# Patient Record
Sex: FEMALE | URBAN - METROPOLITAN AREA
[De-identification: names, ages, dates, MRNs, and addresses within clinical notes are randomized per-mention and may not be internally consistent; named-entity substitution may affect disease eponyms.]

---

## 2022-03-16 ENCOUNTER — APPOINTMENT (OUTPATIENT)
Dept: URBAN - METROPOLITAN AREA CLINIC 267 | Age: 72
Setting detail: DERMATOLOGY
End: 2022-03-24

## 2022-03-16 VITALS — HEIGHT: 66 IN | WEIGHT: 169 LBS

## 2022-03-16 DIAGNOSIS — L71.8 OTHER ROSACEA: ICD-10-CM

## 2022-03-16 DIAGNOSIS — L20.89 OTHER ATOPIC DERMATITIS: ICD-10-CM

## 2022-03-16 PROCEDURE — OTHER MIPS QUALITY: OTHER

## 2022-03-16 PROCEDURE — 99214 OFFICE O/P EST MOD 30 MIN: CPT

## 2022-03-16 PROCEDURE — OTHER PRESCRIPTION: OTHER

## 2022-03-16 PROCEDURE — OTHER PRESCRIPTION MEDICATION MANAGEMENT: OTHER

## 2022-03-16 PROCEDURE — OTHER COUNSELING: OTHER

## 2022-03-16 RX ORDER — CLOBETASOL PROPIONATE 0.5 MG/G
CREAM TOPICAL BID
Qty: 45 | Refills: 3 | Status: ERX | COMMUNITY
Start: 2022-03-16

## 2022-03-16 RX ORDER — DOXYCYCLINE HYCLATE 20 MG/1
TABLET, FILM COATED ORAL
Qty: 180 | Refills: 3 | Status: ERX | COMMUNITY
Start: 2022-03-16

## 2022-03-16 ASSESSMENT — LOCATION DETAILED DESCRIPTION DERM
LOCATION DETAILED: LEFT ULNAR DORSAL HAND
LOCATION DETAILED: GLABELLA
LOCATION DETAILED: RIGHT DISTAL PRETIBIAL REGION
LOCATION DETAILED: LEFT DISTAL PRETIBIAL REGION
LOCATION DETAILED: 2ND WEB SPACE RIGHT HAND
LOCATION DETAILED: NASAL DORSUM
LOCATION DETAILED: RIGHT NASAL ALA

## 2022-03-16 ASSESSMENT — LOCATION SIMPLE DESCRIPTION DERM
LOCATION SIMPLE: GLABELLA
LOCATION SIMPLE: RIGHT HAND
LOCATION SIMPLE: LEFT PRETIBIAL REGION
LOCATION SIMPLE: LEFT HAND
LOCATION SIMPLE: NOSE
LOCATION SIMPLE: RIGHT PRETIBIAL REGION
LOCATION SIMPLE: RIGHT NOSE

## 2022-03-16 ASSESSMENT — LOCATION ZONE DERM
LOCATION ZONE: FACE
LOCATION ZONE: NOSE
LOCATION ZONE: LEG
LOCATION ZONE: HAND

## 2022-03-16 NOTE — PROCEDURE: PRESCRIPTION MEDICATION MANAGEMENT
Initiate Treatment: doxycycline hyclate 20 mg tablet Take one tablet twice a day with food\\nQuantity: 180.0 Tablet\\nSi tab po bid with meal for rosacea
Render In Strict Bullet Format?: No
Continue Regimen: clobetasol 0.05 % topical cream Bid\\nQuantity: 45.0 g  Days Supply: 30\\nSig: Apply to affected areas bid
Detail Level: Simple

## 2022-06-15 ENCOUNTER — APPOINTMENT (OUTPATIENT)
Dept: URBAN - METROPOLITAN AREA CLINIC 267 | Age: 72
Setting detail: DERMATOLOGY
End: 2022-06-15

## 2022-06-15 VITALS — HEIGHT: 66 IN | WEIGHT: 168 LBS

## 2022-06-15 DIAGNOSIS — L71.8 OTHER ROSACEA: ICD-10-CM

## 2022-06-15 PROCEDURE — OTHER COUNSELING: OTHER

## 2022-06-15 PROCEDURE — OTHER PRESCRIPTION MEDICATION MANAGEMENT: OTHER

## 2022-06-15 PROCEDURE — 99213 OFFICE O/P EST LOW 20 MIN: CPT

## 2022-06-15 PROCEDURE — OTHER PRESCRIPTION: OTHER

## 2022-06-15 RX ORDER — DOXYCYCLINE HYCLATE 20 MG/1
TABLET, FILM COATED ORAL BID
Qty: 180 | Refills: 3 | Status: ERX

## 2022-06-15 RX ORDER — AZELAIC ACID 0.15 G/G
GEL TOPICAL BID
Qty: 50 | Refills: 5 | Status: ERX | COMMUNITY
Start: 2022-06-15

## 2022-06-15 NOTE — PROCEDURE: PRESCRIPTION MEDICATION MANAGEMENT
Detail Level: Generalized
Continue Regimen: doxycycline hyclate 20 mg tablet BID\\nQuantity: 180.0 Tablet  Days Supply: 90\\nSig: Take one tablet twice a day with food.\\n\\nFinacea 15 % topical gel Bid\\nQuantity: 50.0 g  Days Supply: 30\\nSig: Apply to face twice a day for rosacea
Render In Strict Bullet Format?: No

## 2023-12-28 ENCOUNTER — APPOINTMENT (OUTPATIENT)
Dept: URBAN - METROPOLITAN AREA CLINIC 300 | Age: 73
Setting detail: DERMATOLOGY
End: 2023-12-29

## 2023-12-28 DIAGNOSIS — L27.0 GENERALIZED SKIN ERUPTION DUE TO DRUGS AND MEDICAMENTS TAKEN INTERNALLY: ICD-10-CM

## 2023-12-28 DIAGNOSIS — L71.8 OTHER ROSACEA: ICD-10-CM

## 2023-12-28 PROCEDURE — OTHER MIPS QUALITY: OTHER

## 2023-12-28 PROCEDURE — OTHER PRESCRIPTION MEDICATION MANAGEMENT: OTHER

## 2023-12-28 PROCEDURE — OTHER PRESCRIPTION: OTHER

## 2023-12-28 PROCEDURE — 99213 OFFICE O/P EST LOW 20 MIN: CPT

## 2023-12-28 PROCEDURE — OTHER ADDITIONAL NOTES: OTHER

## 2023-12-28 PROCEDURE — OTHER COUNSELING: OTHER

## 2023-12-28 RX ORDER — AZELAIC ACID 0.15 G/G
GEL TOPICAL BID
Qty: 50 | Refills: 8 | Status: ERX

## 2023-12-28 RX ORDER — CLOBETASOL PROPIONATE 0.5 MG/G
CREAM TOPICAL BID
Qty: 45 | Refills: 3 | Status: ERX

## 2023-12-28 RX ORDER — DOXYCYCLINE HYCLATE 20 MG/1
TABLET, FILM COATED ORAL BID
Qty: 180 | Refills: 3 | Status: ERX

## 2023-12-28 ASSESSMENT — LOCATION DETAILED DESCRIPTION DERM
LOCATION DETAILED: PERIUMBILICAL SKIN
LOCATION DETAILED: RIGHT DORSAL MIDDLE METACARPOPHALANGEAL JOINT
LOCATION DETAILED: GLABELLA
LOCATION DETAILED: RIGHT SUPERIOR MEDIAL UPPER BACK
LOCATION DETAILED: LEFT MEDIAL SUPERIOR CHEST
LOCATION DETAILED: RIGHT SUPERIOR MEDIAL MIDBACK
LOCATION DETAILED: LEFT ULNAR DORSAL HAND

## 2023-12-28 ASSESSMENT — LOCATION SIMPLE DESCRIPTION DERM
LOCATION SIMPLE: LEFT HAND
LOCATION SIMPLE: GLABELLA
LOCATION SIMPLE: CHEST
LOCATION SIMPLE: RIGHT UPPER BACK
LOCATION SIMPLE: ABDOMEN
LOCATION SIMPLE: RIGHT HAND
LOCATION SIMPLE: RIGHT LOWER BACK

## 2023-12-28 ASSESSMENT — LOCATION ZONE DERM
LOCATION ZONE: TRUNK
LOCATION ZONE: HAND
LOCATION ZONE: FACE

## 2023-12-28 NOTE — PROCEDURE: PRESCRIPTION MEDICATION MANAGEMENT
Detail Level: Generalized
Initiate Treatment: Aquaglycolic acid cleanser
Continue Regimen: doxycycline hyclate 20 mg tablet BID\\nQuantity: 180.0 Tablet  Days Supply: 90\\nSig: Take one tablet twice a day with food.\\n\\nFinacea 15 % topical gel Bid\\nQuantity: 50.0 g  Days Supply: 30\\nSig: Apply to face twice a day for rosacea
Render In Strict Bullet Format?: No
Continue Regimen: clobetasol 0.05 % topical cream Bid\\nQuantity: 45.0 g  Days Supply: 30\\nSig: Apply to affected areas bid
Detail Level: Simple

## 2023-12-28 NOTE — HPI: RASH (ROSACEA)
How Severe Is Your Rosacea?: mild
Is This A New Presentation, Or A Follow-Up?: Follow Up Rosacea
Additional History: Pt using doxycycline hyclate 20 mg tablet Take one tablet twice a day with food and Finacea 15 % topical gel Apply to face twice a day for rosacea.\\nPt need refills on doxycycline and finacea.\\n

## 2023-12-28 NOTE — HPI: RASH (ATOPIC DERMATITIS)
How Severe Is Your Atopic Dermatitis?: moderate
Is This A New Presentation, Or A Follow-Up?: Follow Up Atopic Dermatitis
Additional History: Pt stated having breakouts since starting medication ibandronate.\\nPt has had two pills. States she gets better after a while of not taking pills, once next dose of due and taken she flares up.\\nNeed refills on clobetasol.

## 2023-12-28 NOTE — PROCEDURE: ADDITIONAL NOTES
Render Risk Assessment In Note?: no
Detail Level: Simple
Additional Notes: Ass. With ibadronate medication. \\nFlare up begins after taking dose, clears up then breaks out again after taking another dose.\\nAdvised to discuss with Dr. Carol Colmenares and d/c medication.

## 2024-09-12 ENCOUNTER — APPOINTMENT (OUTPATIENT)
Dept: URBAN - METROPOLITAN AREA CLINIC 300 | Age: 74
Setting detail: DERMATOLOGY
End: 2024-09-12

## 2024-09-12 DIAGNOSIS — L57.0 ACTINIC KERATOSIS: ICD-10-CM

## 2024-09-12 DIAGNOSIS — L81.4 OTHER MELANIN HYPERPIGMENTATION: ICD-10-CM

## 2024-09-12 DIAGNOSIS — L82.1 OTHER SEBORRHEIC KERATOSIS: ICD-10-CM

## 2024-09-12 DIAGNOSIS — D22 MELANOCYTIC NEVI: ICD-10-CM

## 2024-09-12 DIAGNOSIS — D18.0 HEMANGIOMA: ICD-10-CM

## 2024-09-12 PROBLEM — D22.39 MELANOCYTIC NEVI OF OTHER PARTS OF FACE: Status: ACTIVE | Noted: 2024-09-12

## 2024-09-12 PROBLEM — D22.61 MELANOCYTIC NEVI OF RIGHT UPPER LIMB, INCLUDING SHOULDER: Status: ACTIVE | Noted: 2024-09-12

## 2024-09-12 PROBLEM — D22.62 MELANOCYTIC NEVI OF LEFT UPPER LIMB, INCLUDING SHOULDER: Status: ACTIVE | Noted: 2024-09-12

## 2024-09-12 PROBLEM — D18.01 HEMANGIOMA OF SKIN AND SUBCUTANEOUS TISSUE: Status: ACTIVE | Noted: 2024-09-12

## 2024-09-12 PROBLEM — D22.5 MELANOCYTIC NEVI OF TRUNK: Status: ACTIVE | Noted: 2024-09-12

## 2024-09-12 PROCEDURE — 99213 OFFICE O/P EST LOW 20 MIN: CPT | Mod: 25

## 2024-09-12 PROCEDURE — OTHER MIPS QUALITY: OTHER

## 2024-09-12 PROCEDURE — OTHER LIQUID NITROGEN: OTHER

## 2024-09-12 PROCEDURE — OTHER REASSURANCE: OTHER

## 2024-09-12 PROCEDURE — OTHER COUNSELING: OTHER

## 2024-09-12 PROCEDURE — 17000 DESTRUCT PREMALG LESION: CPT

## 2024-09-12 ASSESSMENT — LOCATION DETAILED DESCRIPTION DERM
LOCATION DETAILED: RIGHT DISTAL RADIAL DORSAL FOREARM
LOCATION DETAILED: LEFT INFERIOR FOREHEAD
LOCATION DETAILED: RIGHT SUPERIOR LATERAL MIDBACK
LOCATION DETAILED: LEFT RADIAL DORSAL HAND
LOCATION DETAILED: RIGHT INFERIOR CENTRAL MALAR CHEEK
LOCATION DETAILED: RIGHT SUPERIOR LATERAL UPPER BACK
LOCATION DETAILED: RIGHT CENTRAL EYEBROW
LOCATION DETAILED: LEFT INFERIOR UPPER BACK
LOCATION DETAILED: LEFT DISTAL DORSAL FOREARM
LOCATION DETAILED: RIGHT DISTAL DORSAL FOREARM
LOCATION DETAILED: RIGHT PROXIMAL RADIAL DORSAL FOREARM
LOCATION DETAILED: LEFT MID-UPPER BACK
LOCATION DETAILED: LEFT PROXIMAL DORSAL FOREARM
LOCATION DETAILED: RIGHT PROXIMAL DORSAL FOREARM
LOCATION DETAILED: LEFT CENTRAL MALAR CHEEK

## 2024-09-12 ASSESSMENT — LOCATION ZONE DERM
LOCATION ZONE: FACE
LOCATION ZONE: TRUNK
LOCATION ZONE: ARM
LOCATION ZONE: HAND

## 2024-09-12 ASSESSMENT — LOCATION SIMPLE DESCRIPTION DERM
LOCATION SIMPLE: RIGHT LOWER BACK
LOCATION SIMPLE: LEFT FOREARM
LOCATION SIMPLE: LEFT CHEEK
LOCATION SIMPLE: LEFT HAND
LOCATION SIMPLE: RIGHT UPPER BACK
LOCATION SIMPLE: LEFT FOREHEAD
LOCATION SIMPLE: RIGHT EYEBROW
LOCATION SIMPLE: LEFT UPPER BACK
LOCATION SIMPLE: RIGHT FOREARM
LOCATION SIMPLE: RIGHT CHEEK